# Patient Record
Sex: FEMALE | Race: WHITE
[De-identification: names, ages, dates, MRNs, and addresses within clinical notes are randomized per-mention and may not be internally consistent; named-entity substitution may affect disease eponyms.]

---

## 2019-01-14 ENCOUNTER — HOSPITAL ENCOUNTER (INPATIENT)
Dept: HOSPITAL 41 - JD.OB | Age: 29
LOS: 2 days | Discharge: HOME | DRG: 541 | End: 2019-01-16
Attending: OBSTETRICS & GYNECOLOGY | Admitting: OBSTETRICS & GYNECOLOGY
Payer: COMMERCIAL

## 2019-01-14 DIAGNOSIS — O36.5930: Primary | ICD-10-CM

## 2019-01-14 DIAGNOSIS — F17.200: ICD-10-CM

## 2019-01-14 DIAGNOSIS — F11.20: ICD-10-CM

## 2019-01-14 DIAGNOSIS — Z79.899: ICD-10-CM

## 2019-01-14 DIAGNOSIS — F41.9: ICD-10-CM

## 2019-01-14 DIAGNOSIS — Z3A.38: ICD-10-CM

## 2019-01-14 PROCEDURE — 0UQMXZZ REPAIR VULVA, EXTERNAL APPROACH: ICD-10-PCS | Performed by: OBSTETRICS & GYNECOLOGY

## 2019-01-14 PROCEDURE — 00HU33Z INSERTION OF INFUSION DEVICE INTO SPINAL CANAL, PERCUTANEOUS APPROACH: ICD-10-PCS

## 2019-01-14 PROCEDURE — 3E033VJ INTRODUCTION OF OTHER HORMONE INTO PERIPHERAL VEIN, PERCUTANEOUS APPROACH: ICD-10-PCS | Performed by: OBSTETRICS & GYNECOLOGY

## 2019-01-14 PROCEDURE — 3E0R3BZ INTRODUCTION OF ANESTHETIC AGENT INTO SPINAL CANAL, PERCUTANEOUS APPROACH: ICD-10-PCS

## 2019-01-14 PROCEDURE — 6A550ZT PHERESIS OF CORD BLOOD STEM CELLS, SINGLE: ICD-10-PCS | Performed by: OBSTETRICS & GYNECOLOGY

## 2019-01-14 PROCEDURE — 10907ZC DRAINAGE OF AMNIOTIC FLUID, THERAPEUTIC FROM PRODUCTS OF CONCEPTION, VIA NATURAL OR ARTIFICIAL OPENING: ICD-10-PCS | Performed by: OBSTETRICS & GYNECOLOGY

## 2019-01-14 PROCEDURE — 10D17Z9 MANUAL EXTRACTION OF PRODUCTS OF CONCEPTION, RETAINED, VIA NATURAL OR ARTIFICIAL OPENING: ICD-10-PCS | Performed by: OBSTETRICS & GYNECOLOGY

## 2019-01-14 NOTE — PCM.DEL
L & D Note





- General Info


Date of Service: 19





- Delivery Note


Labor: Induced by ARM, Induced by Oxytocin


Delivery Outcome: Livebirth


Infant Delivery Method: Spontaneous Vaginal Delivery-Single


Infant Delivery Mode: Spontaneous


Birth Presentation: Right Occiput Anterior (CRISELDA)


Nuchal Cord: None


Anesthesia Type: Epidural


Amniotic Fluid Description: Clear


Episiotomy Type: None


Laceration: Periurethral


Suture type: Vicryl


Suture size: 3-0


Placenta: Intact, Manual Removal


Cord: 3 Vessels


Estimated Blood Loss: 350


[place]: Bulb Syringe, Stimulated, Warmed, [place] Used, Warmer Used


Delivery Comments (Free Text/Narrative):: 


Patient found to be complete and began pushing.  With maternal pushing effort 

fetal head delivered from an CRISELDA presentation.  No nuchal cord present.  With 

gentle downward traction the fetal shoulder and body delivered.  Infant placed 

on maternal abdomen.  Cord clamped and cut.  Cord blood obtained.  Placenta 

given 30 minutes but did not show any sign of release.  Pitocin discontinued (

had been started after delivery of infant) and hand inserted into the uterus to 

the level of the fundus.  With care plane found between uterus and placenta.  

Adherent area noted at very top of fundus.  This was able to be freed and 

placenta then removed.  Inspection showed it to be intact and two further 

passes into the uterus confirmed no residual membranes or placenta tissue.  

During this process IV was lost.  Patient given IM pitocin and buccal cytotec 

to aid in uterine tone.  Also given dose of IM ancef.  Inspection of the 

perineum showed a left periurethral tear which was repaired with a running 3-0 

vicryl. 





- General Info


Date of Service: 01/15/19





- Patient Data


Vitals - Most Recent: 


 Last Vital Signs











Temp  36.6 C   19 15:38


 


Pulse  98   19 15:38


 


Resp  18   19 15:38


 


BP  130/80   19 15:38


 


Pulse Ox  100   19 15:38











Weight - Most Recent: 66.179 kg


Lab Results Last 24 Hours: 


 Laboratory Results - last 24 hr











  19 Range/Units





  15:49 15:49 


 


WBC  8.05   (3.98-10.04)  K/mm3


 


RBC  4.53   (3.98-5.22)  M/mm3


 


Hgb  13.6   (11.2-15.7)  gm/L


 


Hct  39.8   (34.1-44.9)  %


 


MCV  87.9   (79.4-94.8)  fl


 


MCH  30.0   (25.6-32.2)  pg


 


MCHC  34.2   (32.2-35.5)  g/dl


 


RDW Std Deviation  42.0   (36.4-46.3)  fL


 


Plt Count  232   (182-369)  K/mm3


 


MPV  9.3 L   (9.4-12.3)  fl


 


Blood Type   O POSITIVE  


 


Gel Antibody Screen   Negative  











Med Orders - Current: 


 Current Medications





Cefazolin Sodium (Ancef)  1 gm IM ONETIME ONE


   Stop: 19 22:45


Diphenhydramine HCl (Benadryl)  25 mg IVPUSH Q6H PRN


   PRN Reason: Pruritis


Ephedrine Sulfate (Ephedrine Sulfate)  5 mg IVPUSH ASDIRECTED PRN


   PRN Reason: Hypotension


Fentanyl (Sublimaze)  100 mcg EPIDUR Q3H PRN


   PRN Reason: Pain


   Last Admin: 19 17:31 Dose:  100 mcg


Fentanyl/Bupivacaine HCl (Fentanyl-Bupiv-Ns 2 Mcg/Ml-0.125%)  100 ml EP 

ASDIRECTED KELLY


   Last Admin: 19 17:31 Dose:  100 ml


Lactated Ringer's (Ringers, Lactated)  1,000 mls @ 100 mls/hr IV ASDIRECTED KELLY


   Last Admin: 19 17:36 Dose:  100 mls/hr


Oxytocin/Lactated Ringer's (Pitocin In Lr 10 Units/1,000 Ml)  10 unit in 1,000 

mls @ 500 mls/hr IV .CONTINUOUS KELLY


Oxytocin/Lactated Ringer's (Pitocin In Lr 10 Units/1,000 Ml)  10 unit in 1,000 

mls @ 12 mls/hr IV TITRATE KELLY; Protocol


   Last Titration: 19 18:34 Dose:  4 munits/min, 24 mls/hr


Misoprostol (Cytotec)  600 mcg PO NOW STA


   Stop: 19 22:45


Nalbuphine HCl (Nubain)  10 mg IVPUSH Q2H PRN


   PRN Reason: pain


Ondansetron HCl (Zofran)  4 mg IVPUSH Q4H PRN


   PRN Reason: Nausea/Vomiting


Ondansetron HCl (Zofran)  4 mg IVPUSH ONETIME PRN


   PRN Reason: Nausea/Vomiting


Sodium Chloride (Saline Flush)  10 ml FLUSH ASDIRECTED PRN


   PRN Reason: Keep Vein Open





Discontinued Medications





Fentanyl/Bupivacaine HCl (Fentanyl-Bupiv-Ns 2 Mcg/Ml-0.125%)  100 ml EP 

ASDIRECTED KELLY


Misoprostol (Cytotec) Confirm Administered Dose 200 mcg .ROUTE .STK-MED ONE


   Stop: 19 22:37


Oxytocin (Pitocin) Confirm Administered Dose 10 unit .ROUTE .STK-MED ONE


   Stop: 19 22:37











- Problem List & Annotations


(1) 38 weeks gestation of pregnancy


SNOMED Code(s): 68777340


   Code(s): Z3A.38 - 38 WEEKS GESTATION OF PREGNANCY   Status: Acute   Current 

Visit: Yes   





(2) IUGR (intrauterine growth restriction)


SNOMED Code(s): 88601396


   Code(s): WTT3997 -    Status: Acute   Current Visit: Yes   





(3) Pregnancy complicated by subutex maintenance, antepartum


SNOMED Code(s): 66963175


   Code(s): O99.320 - DRUG USE COMPLICATING PREGNANCY, UNSPECIFIED TRIMESTER; 

F11.20 - OPIOID DEPENDENCE, UNCOMPLICATED   Status: Acute   Current Visit: Yes 

  





- Problem List Review


Problem List Initiated/Reviewed/Updated: Yes





- My Orders


Last 24 Hours: 


My Active Orders





19 15:37


Nalbuphine [Nubain]   10 mg IVPUSH Q2H PRN 


Ondansetron [Zofran]   4 mg IVPUSH Q4H PRN 


Sodium Chloride 0.9% [Saline Flush]   10 ml FLUSH ASDIRECTED PRN 


Resuscitation Status Routine 





19 15:38


Patient Status [ADT] Routine 


Activity as Tolerated [RC] PFP 


Communication Order [RC] ASDIRECTED 


Fetal Heart Tones [RC] ASDIRECTED 


Notify Provider [RC] PFP 


Notify Provider [RC] PRN 


Peripheral IV Care [RC] .AS DIRECTED 


Vital Signs [RC] PER UNIT ROUTINE 


Electronic Fetal Heart Tones Ext w TOCO [WOMSER] Routine 


Electronic Fetal Heart Tones Internal [WOMSER] Per Unit Routine 


Peripheral IV Insertion Adult [OM.PC] Routine 





19 15:45


Lactated Ringers [Ringers, Lactated] 1,000 ml IV ASDIRECTED 


Oxytocin/Lactated Ringers [Pitocin in LR 10 Units/1,000 ML] 10 unit in 1,000 ml 

IV .CONTINUOUS 


Oxytocin/Lactated Ringers [Pitocin in LR 10 Units/1,000 ML] 10 unit in 1,000 ml 

IV TITRATE 





19 15:49


RAPID PLASMA REAGIN,RPR [CHEM] Routine 





19 22:44


ceFAZolin [Ancef]   1 gm IM ONETIME ONE 


miSOPROStol [Cytotec]   600 mcg PO NOW STA 





19 Dinner


Regular Diet [DIET] 














- Assessment


Assessment:: 





29 y/o G2 now  PPD#0 from  at 38 4/7 wks 





- Plan


Plan:: 








* Routine cares 


* Breast feeding 


* Continue home subutex


* Discharge home in 2 days

## 2019-01-14 NOTE — PCM.LDHP
L&D History of Present Illness





- General


Date of Service: 19


Admit Problem/Dx: 


 Patient Status Order with Admit Dx/Problem





19 15:38


Patient Status [ADT] Routine 








 Admission Diagnosis/Problem











Admission Diagnosis/Problem    High risk pregnancy














Source of Information: Patient


History Limitations: Reports: No Limitations





- History of Present Illness


Introduction:: 





Patient is a 29 y/o  at 38 4/7 wks who presents for IOL due to findings 

of growth restriction on MFM US evaluation today.  Had been followed by that 

team due to history of subutex use in pregnancy and prior child affected by 

VACTRL syndrome.  Doing well currently.  No significant contractions. 





- Related Data


Allergies/Adverse Reactions: 


 Allergies











Allergy/AdvReac Type Severity Reaction Status Date / Time


 


No Known Allergies Allergy   Verified 19 16:32











Home Medications: 


 Home Meds





Buprenorphine HCl 4 mg SL 5XDAY 19 [History]


Prenatal Vit No.78/Iron/Fa [Prenatabs FA] 1 each PO DAILY 19 [History]











Past Medical History


OB/GYN History: Reports: Pregnancy


: 2


Para: 1


LMP (Approximate): Pregnant


Psychiatric History: Reports: Anxiety





- Past Surgical History


HEENT Surgical History: Reports: Myringotomy w Tube(s), Oral Surgery (tooth 

extraction)





Social & Family History





- Tobacco Use


Smoking Status *Q: Current Every Day Smoker





- Alcohol Use


Alcohol Use History: No





- Recreational Drug Use


Recreational Drug Use: No





H&P Review of Systems





- Review of Systems:


Review Of Systems: See Below


General: Reports: No Symptoms


Pulmonary: Reports: No Symptoms


Cardiovascular: Reports: No Symptoms


Gastrointestinal: Reports: No Symptoms


Genitourinary: Reports: No Symptoms


Musculoskeletal: Reports: No Symptoms


Psychiatric: Reports: No Symptoms





L&D Exam





- Exam


Exam: See Below





- OB Specific


Contraction Intensity: Mild


Fetal Movement: Active


Fetal Heart Tones: Present


Fetal Heart Tones per Min: 135


Fetal Heart Rate (FHR) Variability: Moderate (6-25 bmp)


Birth Presentation: Vertex





- Schwartz Score


Schwartz Score Cervix Position: Anterior


Schwartz Score Consistency: Soft


Schwartz Score Effacement: >80%


Schwartz Score Dilation: 3-4 cm


Schwartz Score Infant's Station: +1, +2


Schwartz Score Total: 12





- Exam


General: Alert, Oriented, Cooperative


Lungs: Clear to Auscultation, Normal Respiratory Effort


Cardiovascular: Regular Rate, Regular Rhythm


GI/Abdominal Exam: Soft, Non-Tender


Genitourinary: Normal external exam


Extremities: Normal Inspection


Skin: Warm, Dry, Intact





- Patient Data


Lab Results Last 24 hrs: 


 Laboratory Results - last 24 hr











  19 Range/Units





  15:49 


 


WBC  8.05  (3.98-10.04)  K/mm3


 


RBC  4.53  (3.98-5.22)  M/mm3


 


Hgb  13.6  (11.2-15.7)  gm/L


 


Hct  39.8  (34.1-44.9)  %


 


MCV  87.9  (79.4-94.8)  fl


 


MCH  30.0  (25.6-32.2)  pg


 


MCHC  34.2  (32.2-35.5)  g/dl


 


RDW Std Deviation  42.0  (36.4-46.3)  fL


 


Plt Count  232  (182-369)  K/mm3


 


MPV  9.3 L  (9.4-12.3)  fl











Result Diagrams: 


 19 15:49








- Problem List


(1) 38 weeks gestation of pregnancy


SNOMED Code(s): 69012840


   ICD Code: Z3A.38 - 38 WEEKS GESTATION OF PREGNANCY   Status: Acute   Current 

Visit: Yes   





(2) IUGR (intrauterine growth restriction)


SNOMED Code(s): 90976302


   ICD Code: RVP5410 -    Status: Acute   Current Visit: Yes   





(3) Pregnancy complicated by subutex maintenance, antepartum


SNOMED Code(s): 58595984


   ICD Code: O99.320 - DRUG USE COMPLICATING PREGNANCY, UNSPECIFIED TRIMESTER; 

F11.20 - OPIOID DEPENDENCE, UNCOMPLICATED   Status: Acute   Current Visit: Yes 

  


Problem List Initiated/Reviewed/Updated: Yes


Orders Last 24hrs: 


 Active Orders 24 hr











 Category Date Time Status


 


 Patient Status [ADT] Routine ADT  19 15:38 Active


 


 Activity as Tolerated [RC] PFP Care  19 15:38 Active


 


 Communication Order [RC] ASDIRECTED Care  19 15:38 Active


 


 Fetal Heart Tones [RC] ASDIRECTED Care  19 15:38 Active


 


 Fetal Non Stress Test [RC] PER UNIT ROUTINE Care  19 15:38 Active


 


 Notify Provider [RC] PFP Care  19 15:38 Active


 


 Notify Provider [RC] PRN Care  19 15:38 Active


 


 Peripheral IV Care [RC] .AS DIRECTED Care  19 15:38 Active


 


 Vital Signs [RC] PER UNIT ROUTINE Care  19 15:38 Active


 


 Regular Diet [DIET] Diet  19 Dinner Active


 


 RAPID PLASMA REAGIN,RPR [CHEM] Routine Lab  19 15:49 Received


 


 TYPE AND SCREEN [BBK] Stat Lab  19 15:49 Received


 


 Lactated Ringers [Ringers, Lactated] 1,000 ml Med  19 15:45 Pending





 IV ASDIRECTED   


 


 Nalbuphine [Nubain] Med  19 15:37 Ordered





 10 mg IVPUSH Q2H PRN   


 


 Ondansetron [Zofran] Med  19 15:37 Ordered





 4 mg IVPUSH Q4H PRN   


 


 Oxytocin/Lactated Ringers [Pitocin in LR 10 Units/1,000 Med  19 15:45 

Ordered





 ML]   





 10 unit in 1,000 ml IV .CONTINUOUS   


 


 Oxytocin/Lactated Ringers [Pitocin in LR 10 Units/1,000 Med  19 15:45 

Ordered





 ML]   





 10 unit in 1,000 ml IV TITRATE   


 


 Sodium Chloride 0.9% [Saline Flush] Med  19 15:37 Ordered





 10 ml FLUSH ASDIRECTED PRN   


 


 Electronic Fetal Heart Tones Ext w TOCO [WOMSER] Oth  19 15:38 Ordered





 Routine   


 


 Electronic Fetal Heart Tones Internal [WOMSER] Per Unit Oth  19 15:38 

Ordered





 Routine   


 


 Peripheral IV Insertion Adult [OM.PC] Routine Oth  19 15:38 Ordered


 


 Resuscitation Status Routine Resus Stat  19 15:37 Ordered








 Medication Orders





Lactated Ringer's (Ringers, Lactated)  1,000 mls @ 100 mls/hr IV ASDIRECTED KELLY


Oxytocin/Lactated Ringer's (Pitocin In Lr 10 Units/1,000 Ml)  10 unit in 1,000 

mls @ 500 mls/hr IV .CONTINUOUS KELLY


Oxytocin/Lactated Ringer's (Pitocin In Lr 10 Units/1,000 Ml)  10 unit in 1,000 

mls @ 12 mls/hr IV TITRATE KELLY; Protocol


Nalbuphine HCl (Nubain)  10 mg IVPUSH Q2H PRN


   PRN Reason: pain


Ondansetron HCl (Zofran)  4 mg IVPUSH Q4H PRN


   PRN Reason: Nausea/Vomiting


Sodium Chloride (Saline Flush)  10 ml FLUSH ASDIRECTED PRN


   PRN Reason: Keep Vein Open








Assessment/Plan Comment:: 





29 y/o  at 38 4/7 wks presents for IOL for IUGR





* Labs


* AROM and Pitocin for IOL


* GBS negative, no need for antibiotics


* Pain management per patient preference 


* Anticipate   


* Will make Peds team aware of maternal subutex use

## 2019-01-14 NOTE — PCM.PNLD
Labor Progress Note





- VS & Meds


Vital Signs: 


 Last Vital Signs











Temp  36.6 C   01/14/19 15:38


 


Pulse  98   01/14/19 15:38


 


Resp  18   01/14/19 15:38


 


BP  130/80   01/14/19 15:38


 


Pulse Ox  100   01/14/19 15:38











Active Medications: 


 Current Medications





Diphenhydramine HCl (Benadryl)  25 mg IVPUSH Q6H PRN


   PRN Reason: Pruritis


Ephedrine Sulfate (Ephedrine Sulfate)  5 mg IVPUSH ASDIRECTED PRN


   PRN Reason: Hypotension


Fentanyl (Sublimaze)  100 mcg EPIDUR Q3H PRN


   PRN Reason: Pain


   Last Admin: 01/14/19 17:31 Dose:  100 mcg


Fentanyl/Bupivacaine HCl (Fentanyl-Bupiv-Ns 2 Mcg/Ml-0.125%)  100 ml EP 

ASDIRECTED KELLY


   Last Admin: 01/14/19 17:31 Dose:  100 ml


Lactated Ringer's (Ringers, Lactated)  1,000 mls @ 100 mls/hr IV ASDIRECTED KELLY


   Last Admin: 01/14/19 17:36 Dose:  100 mls/hr


Oxytocin/Lactated Ringer's (Pitocin In Lr 10 Units/1,000 Ml)  10 unit in 1,000 

mls @ 500 mls/hr IV .CONTINUOUS KELLY


Oxytocin/Lactated Ringer's (Pitocin In Lr 10 Units/1,000 Ml)  10 unit in 1,000 

mls @ 12 mls/hr IV TITRATE KELLY; Protocol


   Last Titration: 01/14/19 18:34 Dose:  4 munits/min, 24 mls/hr


Nalbuphine HCl (Nubain)  10 mg IVPUSH Q2H PRN


   PRN Reason: pain


Ondansetron HCl (Zofran)  4 mg IVPUSH Q4H PRN


   PRN Reason: Nausea/Vomiting


Ondansetron HCl (Zofran)  4 mg IVPUSH ONETIME PRN


   PRN Reason: Nausea/Vomiting


Sodium Chloride (Saline Flush)  10 ml FLUSH ASDIRECTED PRN


   PRN Reason: Keep Vein Open





Discontinued Medications





Fentanyl/Bupivacaine HCl (Fentanyl-Bupiv-Ns 2 Mcg/Ml-0.125%)  100 ml EP 

ASDIRECTED KELLY











- Uterine Contractions


Uterine Monitoring Mode: External [place]


Contraction Intensity: Moderate


Uterine Resting Tone: Soft





- Fetal Monitoring


Fetal Monitor Mode: External Ultrasound


Fetal Heart Rate (FHR) Baseline: 135


Fetal Heart Rate (FHR) Variability: Moderate (6-25 bmp)


Fetal Accelerations: Present, 15x15


Fetal Decelerations: None


Fetal Strip Review: Category I





- Vaginal Exam


Dilation (cm): 5


Effacement (Percent): 90


Station: 1


Cervical Position: Anterior





- Labor Progress (Free Text)


Labor Progress: 





Doing well.  Comfortable with epidural.  Pitocin at 4.  Will continue to 

increase per protocol

## 2019-01-15 NOTE — PCM.PNPP
- General Info


Date of Service: 01/15/19


Functional Status: Reports: Pain Controlled, Tolerating Diet, Ambulating, 

Urinating





- Review of Systems


General: Reports: No Symptoms


Pulmonary: Reports: No Symptoms


Cardiovascular: Reports: No Symptoms


Gastrointestinal: Reports: No Symptoms


Genitourinary: Reports: No Symptoms


Musculoskeletal: Reports: No Symptoms


Neurological: Reports: No Symptoms





- General Info


Date of Service: 01/15/19





- Patient Data


Vital Signs - Most Recent: 


 Last Vital Signs











Temp  37.1 C   01/15/19 04:00


 


Pulse  68   01/15/19 04:00


 


Resp  15   01/15/19 04:00


 


BP  120/68   01/15/19 04:00


 


Pulse Ox  99   01/15/19 04:00











Weight - Most Recent: 66.179 kg


Lab Results - Last 24 Hours: 


 Laboratory Results - last 24 hr











  19 Range/Units





  15:49 15:49 


 


WBC  8.05   (3.98-10.04)  K/mm3


 


RBC  4.53   (3.98-5.22)  M/mm3


 


Hgb  13.6   (11.2-15.7)  gm/L


 


Hct  39.8   (34.1-44.9)  %


 


MCV  87.9   (79.4-94.8)  fl


 


MCH  30.0   (25.6-32.2)  pg


 


MCHC  34.2   (32.2-35.5)  g/dl


 


RDW Std Deviation  42.0   (36.4-46.3)  fL


 


Plt Count  232   (182-369)  K/mm3


 


MPV  9.3 L   (9.4-12.3)  fl


 


Blood Type   O POSITIVE  


 


Gel Antibody Screen   Negative  











Med Orders - Current: 


 Current Medications





Acetaminophen (Tylenol)  650 mg PO Q4H PRN


   PRN Reason: Pain


Benzocaine/Menthol (Dermoplast Pain Relief Spray)  0 gm TOP ASDIRECTED PRN


   PRN Reason: Pain


   Last Admin: 01/15/19 01:38 Dose:  1 applic


Ibuprofen (Motrin)  600 mg PO Q4H PRN


   PRN Reason: Pain


   Last Admin: 01/15/19 02:15 Dose:  600 mg


Witch Hazel (Tucks)  1 pad TOP ASDIRECTED PRN


   PRN Reason: Pain


   Last Admin: 01/15/19 01:38 Dose:  1 applic





Discontinued Medications





Cefazolin Sodium (Ancef)  1 gm IM ONETIME ONE


   Stop: 19 22:45


   Last Admin: 01/15/19 01:36 Dose:  1 gm


Diphenhydramine HCl (Benadryl)  25 mg IVPUSH Q6H PRN


   PRN Reason: Pruritis


Ephedrine Sulfate (Ephedrine Sulfate)  5 mg IVPUSH ASDIRECTED PRN


   PRN Reason: Hypotension


Fentanyl (Sublimaze)  100 mcg EPIDUR Q3H PRN


   PRN Reason: Pain


   Last Admin: 19 17:31 Dose:  100 mcg


Fentanyl/Bupivacaine HCl (Fentanyl-Bupiv-Ns 2 Mcg/Ml-0.125%)  100 ml EP 

ASDIRECTED KELLY


Fentanyl/Bupivacaine HCl (Fentanyl-Bupiv-Ns 2 Mcg/Ml-0.125%)  100 ml EP 

ASDIRECTED KELLY


   Last Admin: 19 17:31 Dose:  100 ml


Lactated Ringer's (Ringers, Lactated)  1,000 mls @ 100 mls/hr IV ASDIRECTED KELLY


   Last Admin: 19 17:36 Dose:  100 mls/hr


Oxytocin/Lactated Ringer's (Pitocin In Lr 10 Units/1,000 Ml)  10 unit in 1,000 

mls @ 500 mls/hr IV .CONTINUOUS KELLY


Oxytocin/Lactated Ringer's (Pitocin In Lr 10 Units/1,000 Ml)  10 unit in 1,000 

mls @ 12 mls/hr IV TITRATE KELLY; Protocol


   Last Titration: 19 18:34 Dose:  4 munits/min, 24 mls/hr


Misoprostol (Cytotec) Confirm Administered Dose 200 mcg .ROUTE .STK-MED ONE


   Stop: 19 22:37


Misoprostol (Cytotec)  600 mcg PO NOW STA


   Stop: 19 22:45


   Last Admin: 19 23:00 Dose:  600 mcg


Nalbuphine HCl (Nubain)  10 mg IVPUSH Q2H PRN


   PRN Reason: pain


Ondansetron HCl (Zofran)  4 mg IVPUSH Q4H PRN


   PRN Reason: Nausea/Vomiting


Ondansetron HCl (Zofran)  4 mg IVPUSH ONETIME PRN


   PRN Reason: Nausea/Vomiting


Oxytocin (Pitocin) Confirm Administered Dose 10 unit .ROUTE .STK-MED ONE


   Stop: 19 22:37


   Last Admin: 19 23:00 Dose:  10 unit


Sodium Chloride (Saline Flush)  10 ml FLUSH ASDIRECTED PRN


   PRN Reason: Keep Vein Open











- Infant Interaction


Infant Disposition, Postpartum:  to Nursery


Infant Feeding: Attempted Breastfeeding; Nursed Fair/Poor


Support Person: 





- Postpartum Recovery Exam


Fundal Tone: Firm


Fundal Level: 1 Fingerbreadths Below Umbilicus


Fundal Placement: Midline


Lochia Amount: Scant


Perineum Description: Intact, Minimal Bruising/Swelling


Episiotomy/Laceration: Approximated


Bladder Status: Voiding


Urinary Elimination: Voided





- Exam


General: Alert, Oriented, Cooperative


GI/Abdominal Exam: Soft, Non-Tender


Extremities: Normal Inspection


Skin: Warm, Dry, Intact





- Problem List & Annotations


(1) 38 weeks gestation of pregnancy


SNOMED Code(s): 22110388


   Code(s): Z3A.38 - 38 WEEKS GESTATION OF PREGNANCY   Status: Acute   Current 

Visit: Yes   





(2) IUGR (intrauterine growth restriction)


SNOMED Code(s): 25920966


   Code(s): RPU5373 -    Status: Acute   Current Visit: Yes   





(3) Pregnancy complicated by subutex maintenance, antepartum


SNOMED Code(s): 80452980


   Code(s): O99.320 - DRUG USE COMPLICATING PREGNANCY, UNSPECIFIED TRIMESTER; 

F11.20 - OPIOID DEPENDENCE, UNCOMPLICATED   Status: Acute   Current Visit: Yes 

  





(4) Vaginal delivery


SNOMED Code(s): 104881131


   Code(s): O80 - ENCOUNTER FOR FULL-TERM UNCOMPLICATED DELIVERY   Status: 

Acute   Current Visit: Yes   





(5) Retained placenta


SNOMED Code(s): 756920559


   Code(s): O73.0 - RETAINED PLACENTA WITHOUT HEMORRHAGE   Status: Acute   

Current Visit: Yes   


Qualifiers: 


   Retained placenta detail: complete placenta   Qualified Code(s): O73.0 - 

Retained placenta without hemorrhage   





- Problem List Review


Problem List Initiated/Reviewed/Updated: Yes





- My Orders


Last 24 Hours: 


My Active Orders





19 15:37


Resuscitation Status Routine 





19 15:49


RAPID PLASMA REAGIN,RPR [CHEM] Routine 





19 Dinner


Regular Diet [DIET] 





01/15/19 00:29


Benzocaine/Menthol [Dermoplast Pain Relief Spray]   See Dose Instructions  TOP 

ASDIRECTED PRN 


Witch Hazel [Tucks]   1 pad TOP ASDIRECTED PRN 





01/15/19 01:44


Activity as Tolerated [RC] PER UNIT ROUTINE 


Vital Signs [RC] Q4HR 


Assess Lochia [WOMSER] Per Unit Routine 


Assess Uterine Involution [WOMSER] Per Unit Routine 


Breast Pump [WOMSER] Per Unit Routine 


Medication Administration Instruction [OM.PC] Routine 


Perineal Care [OM.PC] Per Unit Routine 


Sitz Bath [OM.PC] Per Unit Routine 





01/15/19 01:45


Heat Therapy [OM.PC] PRN 





01/15/19 02:03


Ibuprofen [Motrin]   600 mg PO Q4H PRN 





01/15/19 07:05


Acetaminophen [Tylenol]   650 mg PO Q4H PRN 





01/15/19 23:00


Patient Status [ADT] Routine 





19 01:45


Heat Therapy [OM.PC] PRN 














- Assessment


Assessment:: 





27 y/o G2 now  PPD#1 from  at 38 4/7 wks 





- Plan


Plan:: 








* Routine cares 


* Breast feeding 


* Continue home subutex


* Discharge home tomorrow

## 2019-01-15 NOTE — [id number]
Post Anesthesia Note





- EVALUATION WITHIN 48HRS OF ANESTHETIC


Vital Signs in Normal Range: Yes


Patient Participated in Evaluation: Yes


Respiratory Function Stable: Yes


Airway Patent: Yes


Cardiovascular Function Stable: Yes


Hydration Status Stable: Yes


Pain Control Satisfactory: Yes


Nausea and Vomiting Control Satisfactory: Yes


Mental Status Recovered: Yes


Pulse Rate: 85


SaO2: 99


Resp Rate: 15


Blood Pressure: 120/68

## 2019-01-16 NOTE — PCM.DCSUM1
**Discharge Summary





- Discharge Data


Discharge Date: 19


Discharge Disposition: Home, Self-Care 01


Condition: Good





- Discharge Diagnosis/Problem(s)


(1) 38 weeks gestation of pregnancy


SNOMED Code(s): 77064495


   ICD Code: Z3A.38 - 38 WEEKS GESTATION OF PREGNANCY   Status: Acute   Current 

Visit: Yes   





(2) IUGR (intrauterine growth restriction)


SNOMED Code(s): 17066220


   ICD Code: ELN5238 -    Status: Acute   Current Visit: Yes   





(3) Pregnancy complicated by subutex maintenance, antepartum


SNOMED Code(s): 79487394


   ICD Code: O99.320 - DRUG USE COMPLICATING PREGNANCY, UNSPECIFIED TRIMESTER; 

F11.20 - OPIOID DEPENDENCE, UNCOMPLICATED   Status: Acute   Current Visit: Yes 

  





(4) Vaginal delivery


SNOMED Code(s): 451007503


   ICD Code: O80 - ENCOUNTER FOR FULL-TERM UNCOMPLICATED DELIVERY   Status: 

Acute   Current Visit: Yes   





(5) Retained placenta


SNOMED Code(s): 888101472


   ICD Code: O73.0 - RETAINED PLACENTA WITHOUT HEMORRHAGE   Status: Acute   

Current Visit: Yes   


Qualifiers: 


   Retained placenta detail: complete placenta   Qualified Code(s): O73.0 - 

Retained placenta without hemorrhage   





- Patient Summary/Data


Complications: None


Consults: 


None


Recommended Follow-up Testing/Procedures: 


Follow up in 3-6 weeks for postpartum check


Hospital Course: 


29 y/o  presented at 38 4/7 wks for IOL due to suspected IUGR.  

Pregnancy otherwise notable for maternal use of subutex.  Induction done with 

AROM and pitocin augmentation.  Patient did well and had an uncomplicated 

vaginal delivery, however, she did have a retained placenta.  After 30 minutes 

this was manually removed.  Her bleeding was appropriate in following days and 

she was otherwise doing well.  She was discharged on PPD#2 





- Patient Instructions


Diet: Regular Diet as Tolerated


Activity: As Tolerated


Activity, Other: Pelvic Rest for 6 weeks


Driving: May Drive Today


Showering/Bathing: May Shower


Showering/Bathing, Other: May Bathe


Notify Provider of: Fever, Increased Pain, Swelling and Redness, Drainage, 

Nausea and/or Vomiting





- Discharge Plan


*PRESCRIPTION DRUG MONITORING PROGRAM REVIEWED*: Not Applicable


*COPY OF PRESCRIPTION DRUG MONITORING REPORT IN PATIENT MARLENE: Not Applicable


Home Medications: 


 Home Meds





Buprenorphine HCl 4 mg SL 5XDAY 19 [History]


Prenatal Vit No.78/Iron/Fa [Prenatabs FA] 1 each PO DAILY 19 [History]


Ibuprofen [Motrin] 600 mg PO Q4H PRN  tablet 19 [Rx]








Patient Handouts:  Steps to Quit Smoking


Referrals: 


Edith Benitez MD [Primary Care Provider] - 





- Discharge Summary/Plan Comment


DC Time >30 min.: No





- Patient Data


Vitals - Most Recent: 


 Last Vital Signs











Temp  36.6 C   01/15/19 12:35


 


Pulse  56 L  01/15/19 12:35


 


Resp  16   01/15/19 12:35


 


BP  117/76   01/15/19 12:35


 


Pulse Ox  100   01/15/19 12:35











Weight - Most Recent: 66.179 kg


Lab Results - Last 24 hrs: 


 Laboratory Results - last 24 hr











  19 Range/Units





  15:49 


 


RPR  Non-reactive  (NONREACTIVE)  











Med Orders - Current: 


 Current Medications





Acetaminophen (Tylenol)  650 mg PO Q4H PRN


   PRN Reason: Pain


Benzocaine/Menthol (Dermoplast Pain Relief Spray)  0 gm TOP ASDIRECTED PRN


   PRN Reason: Pain


   Last Admin: 01/15/19 01:38 Dose:  1 applic


Emollient Ointment (Lansinoh Hpa)  0 gm TOP ASDIRECTED PRN


   PRN Reason: Pain


   Last Admin: 01/15/19 21:11 Dose:  1 applic


Ibuprofen (Motrin)  600 mg PO Q4H PRN


   PRN Reason: Pain


   Last Admin: 01/15/19 18:45 Dose:  600 mg


Witch Hazel (Tucks)  1 pad TOP ASDIRECTED PRN


   PRN Reason: Pain


   Last Admin: 01/15/19 01:38 Dose:  1 applic





Discontinued Medications





Cefazolin Sodium (Ancef)  1 gm IM ONETIME ONE


   Stop: 19 22:45


   Last Admin: 01/15/19 01:36 Dose:  1 gm


Diphenhydramine HCl (Benadryl)  25 mg IVPUSH Q6H PRN


   PRN Reason: Pruritis


Ephedrine Sulfate (Ephedrine Sulfate)  5 mg IVPUSH ASDIRECTED PRN


   PRN Reason: Hypotension


Fentanyl (Sublimaze)  100 mcg EPIDUR Q3H PRN


   PRN Reason: Pain


   Last Admin: 19 17:31 Dose:  100 mcg


Fentanyl/Bupivacaine HCl (Fentanyl-Bupiv-Ns 2 Mcg/Ml-0.125%)  100 ml EP 

ASDIRECTED KELLY


Fentanyl/Bupivacaine HCl (Fentanyl-Bupiv-Ns 2 Mcg/Ml-0.125%)  100 ml EP 

ASDIRECTED KELLY


   Last Admin: 19 17:31 Dose:  100 ml


Lactated Ringer's (Ringers, Lactated)  1,000 mls @ 100 mls/hr IV ASDIRECTED KELLY


   Last Admin: 19 17:36 Dose:  100 mls/hr


Oxytocin/Lactated Ringer's (Pitocin In Lr 10 Units/1,000 Ml)  10 unit in 1,000 

mls @ 500 mls/hr IV .CONTINUOUS KELLY


Oxytocin/Lactated Ringer's (Pitocin In Lr 10 Units/1,000 Ml)  10 unit in 1,000 

mls @ 12 mls/hr IV TITRATE KELLY; Protocol


   Last Titration: 19 18:34 Dose:  4 munits/min, 24 mls/hr


Misoprostol (Cytotec) Confirm Administered Dose 200 mcg .ROUTE .Spor-MED ONE


   Stop: 19 22:37


Misoprostol (Cytotec)  600 mcg PO NOW STA


   Stop: 19 22:45


   Last Admin: 19 23:00 Dose:  600 mcg


Nalbuphine HCl (Nubain)  10 mg IVPUSH Q2H PRN


   PRN Reason: pain


Ondansetron HCl (Zofran)  4 mg IVPUSH Q4H PRN


   PRN Reason: Nausea/Vomiting


Ondansetron HCl (Zofran)  4 mg IVPUSH ONETIME PRN


   PRN Reason: Nausea/Vomiting


Oxytocin (Pitocin) Confirm Administered Dose 10 unit .ROUTE .Spor-MED ONE


   Stop: 19 22:37


   Last Admin: 19 23:00 Dose:  10 unit


Sodium Chloride (Saline Flush)  10 ml FLUSH ASDIRECTED PRN


   PRN Reason: Keep Vein Open

## 2019-01-16 NOTE — PCM.PNPP
- General Info


Date of Service: 19


Functional Status: Reports: Pain Controlled, Tolerating Diet, Ambulating, 

Urinating





- Review of Systems


General: Reports: No Symptoms


Pulmonary: Reports: No Symptoms


Cardiovascular: Reports: No Symptoms


Gastrointestinal: Reports: No Symptoms


Genitourinary: Reports: No Symptoms


Musculoskeletal: Reports: No Symptoms


Neurological: Reports: No Symptoms





- Patient Data


Vital Signs - Most Recent: 


 Last Vital Signs











Temp  36.6 C   01/15/19 12:35


 


Pulse  56 L  01/15/19 12:35


 


Resp  16   01/15/19 12:35


 


BP  117/76   01/15/19 12:35


 


Pulse Ox  100   01/15/19 12:35











Weight - Most Recent: 66.179 kg


Lab Results - Last 24 Hours: 


 Laboratory Results - last 24 hr











  19 Range/Units





  15:49 


 


RPR  Non-reactive  (NONREACTIVE)  











Med Orders - Current: 


 Current Medications





Acetaminophen (Tylenol)  650 mg PO Q4H PRN


   PRN Reason: Pain


Benzocaine/Menthol (Dermoplast Pain Relief Spray)  0 gm TOP ASDIRECTED PRN


   PRN Reason: Pain


   Last Admin: 01/15/19 01:38 Dose:  1 applic


Emollient Ointment (Lansinoh Hpa)  0 gm TOP ASDIRECTED PRN


   PRN Reason: Pain


   Last Admin: 01/15/19 21:11 Dose:  1 applic


Ibuprofen (Motrin)  600 mg PO Q4H PRN


   PRN Reason: Pain


   Last Admin: 01/15/19 18:45 Dose:  600 mg


Witch Hazel (Tucks)  1 pad TOP ASDIRECTED PRN


   PRN Reason: Pain


   Last Admin: 01/15/19 01:38 Dose:  1 applic





Discontinued Medications





Cefazolin Sodium (Ancef)  1 gm IM ONETIME ONE


   Stop: 19 22:45


   Last Admin: 01/15/19 01:36 Dose:  1 gm


Diphenhydramine HCl (Benadryl)  25 mg IVPUSH Q6H PRN


   PRN Reason: Pruritis


Ephedrine Sulfate (Ephedrine Sulfate)  5 mg IVPUSH ASDIRECTED PRN


   PRN Reason: Hypotension


Fentanyl (Sublimaze)  100 mcg EPIDUR Q3H PRN


   PRN Reason: Pain


   Last Admin: 19 17:31 Dose:  100 mcg


Fentanyl/Bupivacaine HCl (Fentanyl-Bupiv-Ns 2 Mcg/Ml-0.125%)  100 ml EP 

ASDIRECTED KELLY


Fentanyl/Bupivacaine HCl (Fentanyl-Bupiv-Ns 2 Mcg/Ml-0.125%)  100 ml EP 

ASDIRECTED KELLY


   Last Admin: 19 17:31 Dose:  100 ml


Lactated Ringer's (Ringers, Lactated)  1,000 mls @ 100 mls/hr IV ASDIRECTED KELLY


   Last Admin: 19 17:36 Dose:  100 mls/hr


Oxytocin/Lactated Ringer's (Pitocin In Lr 10 Units/1,000 Ml)  10 unit in 1,000 

mls @ 500 mls/hr IV .CONTINUOUS KELLY


Oxytocin/Lactated Ringer's (Pitocin In Lr 10 Units/1,000 Ml)  10 unit in 1,000 

mls @ 12 mls/hr IV TITRATE KELLY; Protocol


   Last Titration: 19 18:34 Dose:  4 munits/min, 24 mls/hr


Misoprostol (Cytotec) Confirm Administered Dose 200 mcg .ROUTE .STK-MED ONE


   Stop: 19 22:37


Misoprostol (Cytotec)  600 mcg PO NOW STA


   Stop: 19 22:45


   Last Admin: 19 23:00 Dose:  600 mcg


Nalbuphine HCl (Nubain)  10 mg IVPUSH Q2H PRN


   PRN Reason: pain


Ondansetron HCl (Zofran)  4 mg IVPUSH Q4H PRN


   PRN Reason: Nausea/Vomiting


Ondansetron HCl (Zofran)  4 mg IVPUSH ONETIME PRN


   PRN Reason: Nausea/Vomiting


Oxytocin (Pitocin) Confirm Administered Dose 10 unit .ROUTE .STK-MED ONE


   Stop: 19 22:37


   Last Admin: 19 23:00 Dose:  10 unit


Sodium Chloride (Saline Flush)  10 ml FLUSH ASDIRECTED PRN


   PRN Reason: Keep Vein Open











- Infant Interaction


Infant Disposition, Postpartum: [place] to Nursery


Infant Interaction: Holding Infant


Infant Feeding:  Infant; Nursed Well


Support Person: 





- Postpartum Recovery Exam


Fundal Tone: Firm


Fundal Level: 1 Fingerbreadths Below Umbilicus


Fundal Placement: Midline


Lochia Amount: Scant


Perineum Description: Intact, Minimal Bruising/Swelling


Episiotomy/Laceration: Approximated


Bladder Status: Voiding


Urinary Elimination: Voided





- Exam


General: Alert, Oriented, Cooperative


GI/Abdominal Exam: Soft, Non-Tender


Extremities: Normal Inspection


Skin: Warm, Dry, Intact





- Problem List & Annotations


(1) 38 weeks gestation of pregnancy


SNOMED Code(s): 46034532


   Code(s): Z3A.38 - 38 WEEKS GESTATION OF PREGNANCY   Status: Acute   Current 

Visit: Yes   





(2) IUGR (intrauterine growth restriction)


SNOMED Code(s): 34731335


   Code(s): INF9791 -    Status: Acute   Current Visit: Yes   





(3) Pregnancy complicated by subutex maintenance, antepartum


SNOMED Code(s): 62154740


   Code(s): O99.320 - DRUG USE COMPLICATING PREGNANCY, UNSPECIFIED TRIMESTER; 

F11.20 - OPIOID DEPENDENCE, UNCOMPLICATED   Status: Acute   Current Visit: Yes 

  





(4) Vaginal delivery


SNOMED Code(s): 587421002


   Code(s): O80 - ENCOUNTER FOR FULL-TERM UNCOMPLICATED DELIVERY   Status: 

Acute   Current Visit: Yes   





(5) Retained placenta


SNOMED Code(s): 648922059


   Code(s): O73.0 - RETAINED PLACENTA WITHOUT HEMORRHAGE   Status: Acute   

Current Visit: Yes   


Qualifiers: 


   Retained placenta detail: complete placenta   Qualified Code(s): O73.0 - 

Retained placenta without hemorrhage   





- Problem List Review


Problem List Initiated/Reviewed/Updated: Yes





- My Orders


Last 24 Hours: 


My Active Orders





01/15/19 01:44


Activity as Tolerated [RC] PER UNIT ROUTINE 


Vital Signs [RC] Q8HR 


Assess Lochia [WOMSER] Per Unit Routine 


Assess Uterine Involution [WOMSER] Per Unit Routine 


Breast Pump [WOMSER] Per Unit Routine 


Medication Administration Instruction [OM.PC] Routine 


Perineal Care [OM.PC] Per Unit Routine 


Sitz Bath [OM.PC] Per Unit Routine 





01/15/19 01:45


Heat Therapy [OM.PC] PRN 





01/15/19 02:03


Ibuprofen [Motrin]   600 mg PO Q4H PRN 





01/15/19 07:05


Acetaminophen [Tylenol]   650 mg PO Q4H PRN 





01/15/19 20:56


Lanolin [Lansinoh HPA]   0 gm TOP ASDIRECTED PRN 





01/15/19 23:00


Patient Status [ADT] Routine 





19 01:45


Heat Therapy [OM.PC] PRN 





19 01:48


Ready for Discharge [RC] PER UNIT ROUTINE 














- Assessment


Assessment:: 





27 y/o G2 now  PPD#2 from  at 38 4/7 wks 





- Plan


Plan:: 








* Routine cares 


* Breast feeding 


* Continue home subutex


* Discharge home today

## 2019-01-22 ENCOUNTER — HOSPITAL ENCOUNTER (EMERGENCY)
Dept: HOSPITAL 41 - JD.ED | Age: 29
Discharge: HOME | End: 2019-01-22
Payer: COMMERCIAL

## 2019-01-22 DIAGNOSIS — F17.210: ICD-10-CM

## 2019-01-22 DIAGNOSIS — M79.89: Primary | ICD-10-CM

## 2019-01-22 NOTE — US
Left lower extremity deep venous ultrasound: Duplex and color flow 

imaging was obtained of the left common femoral, proximal greater 

saphenous, superficial femoral, popliteal, posterior tibial and 

peroneal veins.  Right common femoral vein was also evaluated.

 

Normal phasic flow, augmentation and compression is seen.

 

Impression:

1.  No evidence of deep venous thrombosis within the left lower 

extremity or within the right common femoral vein.

 

Diagnostic code #1

## 2019-01-22 NOTE — EDM.PDOC
<Goldie Ansari - Last Filed: 01/22/19 17:39>





ED HPI GENERAL MEDICAL PROBLEM





- General


Chief Complaint: Lower Extremity Injury/Pain


Stated Complaint: L LEG SWOLLEN POST PARTUM - POSS BLOOD CLOT


Time Seen by Provider: 01/22/19 17:05


Source of Information: Reports: Patient, RN Notes Reviewed


History Limitations: Reports: No Limitations





- History of Present Illness


INITIAL COMMENTS - FREE TEXT/NARRATIVE: 





Lourdes is a 28 year old female who gave birth 8 days ago, who presents with 

left lower extremity swelling for the past few days. Patient states that she 

developed superficial varicosities to the left lower extremity though her 

pregnancy, and that she was evaluated for DVT with US 2-3 months ago which was 

negative for any clot. She wore compression stockings through the remainder of 

her pregnancy. She has since stopped wearing these since giving birth 8 days 

ago. She states that she noticed the swelling Friday while in the shower, and 

just felt that her ankle was more swollen. Saturday while in the shower she 

felt like her lower left leg was more trunk like. She has no pain in the left 

leg, and has not noticed any acute color changes. She has no chest pain or 

shortness of breath. She denies any recent travel or long periods of 

immobilization. Patient states she had a headache on saturday, and took about 

800mg of ibuprofen. This has since resolved. She called her OB to tell her 

about the lower leg swelling and she told her to come to the ED for evaluation.

  





- Related Data


 Allergies











Allergy/AdvReac Type Severity Reaction Status Date / Time


 


No Known Allergies Allergy   Verified 01/14/19 16:32











Home Meds: 


 Home Meds





Buprenorphine HCl 4 mg SL 5XDAY 01/14/19 [History]


Prenatal Vit No.78/Iron/Fa [Prenatabs FA] 1 each PO DAILY 01/14/19 [History]


Ibuprofen [Motrin] 600 mg PO Q4H PRN  tablet 01/16/19 [Rx]











Past Medical History





- Past Health History


Medical/Surgical History: Denies Medical/Surgical History


OB/GYN History: Reports: Pregnancy


Psychiatric History: Reports: Anxiety





- Past Surgical History


HEENT Surgical History: Reports: Myringotomy w Tube(s), Oral Surgery





Social & Family History





- Family History


Family Medical History: Noncontributory





- Tobacco Use


Smoking Status *Q: Current Every Day Smoker


Years of Tobacco use: 10


Packs/Tins Daily: 0.3





- Caffeine Use


Caffeine Use: Reports: Coffee, Soda





- Recreational Drug Use


Recreational Drug Use: Yes


Drug Use in Last 12 Months: No





Review of Systems





- Review of Systems


Review Of Systems: ROS reveals no pertinent complaints other than HPI.





ED EXAM, GENERAL





- Physical Exam


Exam: See Below


Exam Limited By: No Limitations


General Appearance: Alert, WD/WN, No Apparent Distress


Respiratory/Chest: No Respiratory Distress, Lungs Clear, Normal Breath Sounds


Cardiovascular: Normal Peripheral Pulses, Regular Rate, Rhythm, No Edema, No 

Gallop


Peripheral Pulses: 2+: Posterior Tibial (L) (obscured by edema), 4+: Radial (L)

, Radial (R), Femoral (L), Femoral (R), Popliteal (L), Posterior Tibial (R), 

Dorsalis Pedis (L), Dorsalis Pedis (R)


Extremities: Normal Range of Motion, Non-Tender, Normal Capillary Refill, Pedal 

Edema (left, minimal), Other (left lower extremity is warm. no pain with 

palpation. Patient has numerous blue-green varicosities and spider veins 

through the anterior and lateral side of the left lower extremity. ).  No: 

Joint Swelling, Leg Pain, Mottled, Pallor, Redness


Neurological: Alert, Oriented, CN II-XII Intact, Normal Cognition


Psychiatric: Normal Affect, Normal Mood


Skin Exam: Warm, Dry, Intact.  No: Cool, Cyanosis, Pallor





Course





- Vital Signs


Last Recorded V/S: 


 Last Vital Signs











Temp  97.6 F   01/22/19 17:03


 


Pulse  66   01/22/19 17:03


 


Resp  16   01/22/19 17:03


 


BP  114/71   01/22/19 17:03


 


Pulse Ox  100   01/22/19 17:03














- Re-Assessments/Exams


Free Text/Narrative Re-Assessment/Exam: 





01/22/19 17:39


I have low suspicion for a DVT, but being that this patient has a positive risk 

factor having given birth 8 days ago, we will go ahead and US the left lower 

extremity to rule this out. Patient is probably exhibiting some venous 

insufficiency, and will likely need to wear her compression stockings to 

prevent this lower leg swelling. This swelling also could have been exacerbated 

by vasodilation in the warm shower, and fluid retention due to recent NSAID 

use. 





Departure





- Departure


Disposition: Home, Self-Care 01


Clinical Impression: 


 Left leg swelling








- Discharge Information


Referrals: 


PCP,None [Primary Care Provider] - 


Forms:  ED Department Discharge


Additional Instructions: 


elevate the leg as much as you are able to. 





Recommend using the compression stockings you have at home. 





Follow-up with PCP within 2 weeks if not much better. 





Please return to the ER should your symptoms change or worsen. 





<Margaux Humphrey - Last Filed: 01/22/19 20:19>





ED HPI GENERAL MEDICAL PROBLEM





- History of Present Illness


INITIAL COMMENTS - FREE TEXT/NARRATIVE: 





I have seen the patient and agree with the HPI as documented by ASPEN Gann. 





Review of Systems





- Review of Systems


Review Of Systems: See Below


Respiratory: Denies: Shortness of Breath


Cardiovascular: Denies: Chest Pain


Musculoskeletal: Denies: Leg Pain


Skin: Reports: Other (swelling to the left leg, vericose and spider veins to 

the left leg)





ED EXAM, GENERAL





- Physical Exam


Exam: See Below


Exam Limited By: No Limitations


General Appearance: Alert, WD/WN, No Apparent Distress


Extremities: No: Cam's Sign





Course





- Radiology Interpretation


Free Text/Narrative:: 





Left lower extremity deep venous ultrasound: Duplex and color flow imaging was 

obtained of the left common femoral, proximal greater saphenous, superficial 

femoral, popliteal, posterior tibial and peroneal veins.  Right common femoral 

vein was also evaluated.





Normal phasic flow, augmentation and compression is seen.





Impression:


1.  No evidence of deep venous thrombosis within the left lower extremity or 

within the right common femoral vein.





- Re-Assessments/Exams


Free Text/Narrative Re-Assessment/Exam: 








01/22/19 19:10


I have seen the patient and agree with the HPI, ROS and PE as documented by 

ASPEN Gann





ultrasound results reviewed. 





Recommendations given. 





Discharge instructions as documented. 





Departure





- Departure


Time of Disposition: 19:14


Condition: Fair





- Discharge Information


*PRESCRIPTION DRUG MONITORING PROGRAM REVIEWED*: No


*COPY OF PRESCRIPTION DRUG MONITORING REPORT IN PATIENT MARLENE: No